# Patient Record
Sex: FEMALE | Race: WHITE | NOT HISPANIC OR LATINO | URBAN - METROPOLITAN AREA
[De-identification: names, ages, dates, MRNs, and addresses within clinical notes are randomized per-mention and may not be internally consistent; named-entity substitution may affect disease eponyms.]

---

## 2019-09-22 ENCOUNTER — EMERGENCY (EMERGENCY)
Facility: HOSPITAL | Age: 29
LOS: 1 days | Discharge: HOME | End: 2019-09-22
Attending: EMERGENCY MEDICINE
Payer: MEDICAID

## 2019-09-22 VITALS
WEIGHT: 119.93 LBS | HEART RATE: 61 BPM | OXYGEN SATURATION: 100 % | HEIGHT: 63 IN | RESPIRATION RATE: 16 BRPM | DIASTOLIC BLOOD PRESSURE: 72 MMHG | TEMPERATURE: 98 F | SYSTOLIC BLOOD PRESSURE: 107 MMHG

## 2019-09-22 PROCEDURE — 73130 X-RAY EXAM OF HAND: CPT | Mod: 26,RT

## 2019-09-22 PROCEDURE — 99283 EMERGENCY DEPT VISIT LOW MDM: CPT

## 2019-09-22 PROCEDURE — 73130 X-RAY EXAM OF HAND: CPT

## 2019-09-22 PROCEDURE — 90715 TDAP VACCINE 7 YRS/> IM: CPT

## 2019-09-22 PROCEDURE — 12001 RPR S/N/AX/GEN/TRNK 2.5CM/<: CPT

## 2019-09-22 PROCEDURE — 90471 IMMUNIZATION ADMIN: CPT

## 2019-09-22 PROCEDURE — 99283 EMERGENCY DEPT VISIT LOW MDM: CPT | Mod: 25

## 2019-09-22 RX ORDER — IBUPROFEN 200 MG
1 TABLET ORAL
Qty: 30 | Refills: 0
Start: 2019-09-22

## 2019-09-22 RX ORDER — TETANUS TOXOID, REDUCED DIPHTHERIA TOXOID AND ACELLULAR PERTUSSIS VACCINE, ADSORBED 5; 2.5; 8; 8; 2.5 [IU]/.5ML; [IU]/.5ML; UG/.5ML; UG/.5ML; UG/.5ML
0.5 SUSPENSION INTRAMUSCULAR ONCE
Refills: 0 | Status: COMPLETED | OUTPATIENT
Start: 2019-09-22 | End: 2019-09-22

## 2019-09-22 RX ORDER — IBUPROFEN 200 MG
600 TABLET ORAL ONCE
Refills: 0 | Status: COMPLETED | OUTPATIENT
Start: 2019-09-22 | End: 2019-09-22

## 2019-09-22 RX ADMIN — TETANUS TOXOID, REDUCED DIPHTHERIA TOXOID AND ACELLULAR PERTUSSIS VACCINE, ADSORBED 0.5 MILLILITER(S): 5; 2.5; 8; 8; 2.5 SUSPENSION INTRAMUSCULAR at 21:56

## 2019-09-22 RX ADMIN — Medication 600 MILLIGRAM(S): at 20:43

## 2019-09-22 NOTE — ED PROVIDER NOTE - PHYSICAL EXAMINATION
laceration to right fourth digit volar surface  apprx 3 cm with avulsed skin looks partially nonviable  tendons appear intact  small scratch to palmar surgace does not need sutures

## 2019-09-22 NOTE — ED PROVIDER NOTE - OBJECTIVE STATEMENT
27 y/o F with no significant PMHx presents to the ED with complaints of laceration. Patient states she cut her right hand with glass and presents for evaluation. Notes associated pain and numbness locally to the site. Last Tetanus is unknown. Denies any other acute complaints.

## 2019-09-22 NOTE — ED PROVIDER NOTE - PATIENT PORTAL LINK FT
You can access the FollowMyHealth Patient Portal offered by [facility] by registering at the following website: [email or web address]. By joining [employer]’s FollowMyHealth portal, you will also be able to view your health information using other applications (apps) compatible with our system.

## 2024-02-24 ENCOUNTER — EMERGENCY (EMERGENCY)
Facility: HOSPITAL | Age: 34
LOS: 1 days | Discharge: HOME | End: 2024-02-24
Attending: STUDENT IN AN ORGANIZED HEALTH CARE EDUCATION/TRAINING PROGRAM
Payer: MEDICAID

## 2024-02-24 VITALS
OXYGEN SATURATION: 98 % | TEMPERATURE: 98 F | HEART RATE: 67 BPM | RESPIRATION RATE: 16 BRPM | SYSTOLIC BLOOD PRESSURE: 114 MMHG | HEIGHT: 62 IN | WEIGHT: 111.99 LBS | DIASTOLIC BLOOD PRESSURE: 78 MMHG

## 2024-02-24 PROBLEM — Z78.9 OTHER SPECIFIED HEALTH STATUS: Chronic | Status: ACTIVE | Noted: 2019-09-22

## 2024-02-24 PROCEDURE — 73140 X-RAY EXAM OF FINGER(S): CPT

## 2024-02-24 PROCEDURE — 73140 X-RAY EXAM OF FINGER(S): CPT | Mod: 26,RT

## 2024-02-24 PROCEDURE — 99283 EMERGENCY DEPT VISIT LOW MDM: CPT | Mod: 25

## 2024-02-24 PROCEDURE — 99284 EMERGENCY DEPT VISIT MOD MDM: CPT | Mod: 25

## 2024-02-24 PROCEDURE — 11740 EVACUATION SUBUNGUAL HMTMA: CPT | Mod: F7

## 2024-02-24 PROCEDURE — 11740 EVACUATION SUBUNGUAL HMTMA: CPT

## 2024-02-24 RX ORDER — IBUPROFEN 200 MG
600 TABLET ORAL ONCE
Refills: 0 | Status: COMPLETED | OUTPATIENT
Start: 2024-02-24 | End: 2024-02-24

## 2024-02-24 RX ADMIN — Medication 600 MILLIGRAM(S): at 14:57

## 2024-02-24 RX ADMIN — Medication 600 MILLIGRAM(S): at 15:46

## 2024-02-24 NOTE — ED PROVIDER NOTE - OBJECTIVE STATEMENT
33-year-old female, no significant past medical history, presents for evaluation status post right middle finger injury 2 days ago.  Accidentally closed the car door on the finger.  Since then reports increasing swelling and throbbing pain sensation to the fingertip.  Denies any open wounds during initial injury.  Denies any other complaints.

## 2024-02-24 NOTE — ED PROVIDER NOTE - PATIENT PORTAL LINK FT
You can access the FollowMyHealth Patient Portal offered by [facility] by registering at the following website: [email or web address]. By joining [employer]’s FollowMyHealth portal, you will also be able to view your health information using other applications (apps) compatible with our system.

## 2024-02-24 NOTE — ED ADULT NURSE NOTE - NSFALLUNIVINTERV_ED_ALL_ED
Bed/Stretcher in lowest position, wheels locked, appropriate side rails in place/Call bell, personal items and telephone in reach/Instruct patient to call for assistance before getting out of bed/chair/stretcher/Non-slip footwear applied when patient is off stretcher/[place] to call system/Physically safe environment - no spills, clutter or unnecessary equipment/Purposeful proactive rounding/Room/bathroom lighting operational, light cord in reach

## 2024-02-24 NOTE — ED PROVIDER NOTE - NSFOLLOWUPINSTRUCTIONS_ED_ALL_ED_FT
Follow up with the primary care doctor within 1 week.  For pain you can take over the counter Ibuprofen 600 mg orally every 6 hours as needed for pain. Take medication with food.     If you experience any new or worsening symptoms or if you are concerned you can always come back to the emergency for a re-evaluation.  Some results may not be available at the time of your discharge from the hospital. You can download the FOLLOW MY HEALTH abigail and have access to these results.

## 2024-02-24 NOTE — ED PROVIDER NOTE - CLINICAL SUMMARY MEDICAL DECISION MAKING FREE TEXT BOX
33-year-old female status post right middle finger crush injury 2 days ago.  X-ray negative for fracture.  Bedside trephination with a small amount of blood drained.  Patient feels better.  Discussed red flags to come back to the hospital.  Otherwise PMD follow-up within 1 week.

## 2024-02-24 NOTE — ED ADULT TRIAGE NOTE - NS ED TRIAGE AVPU SCALE
Alert-The patient is alert, awake and responds to voice. The patient is oriented to time, place, and person. The triage nurse is able to obtain subjective information. Authored by Resident/PA/NP

## 2024-02-24 NOTE — ED PROVIDER NOTE - PHYSICAL EXAMINATION
Right middle finger full range of motion.  R middle finger 100% subungual hematoma with slight elevation of the eponychium.  No skin break.  No erythema of the finger or streaking.  Localized tenderness over the nail.

## 2024-11-25 ENCOUNTER — EMERGENCY (EMERGENCY)
Facility: HOSPITAL | Age: 34
LOS: 1 days | Discharge: HOME | End: 2024-11-25
Attending: STUDENT IN AN ORGANIZED HEALTH CARE EDUCATION/TRAINING PROGRAM
Payer: MEDICAID

## 2024-11-25 VITALS
RESPIRATION RATE: 18 BRPM | SYSTOLIC BLOOD PRESSURE: 104 MMHG | TEMPERATURE: 98 F | HEIGHT: 64 IN | OXYGEN SATURATION: 98 % | DIASTOLIC BLOOD PRESSURE: 71 MMHG | WEIGHT: 117.95 LBS | HEART RATE: 79 BPM

## 2024-11-25 PROCEDURE — 99283 EMERGENCY DEPT VISIT LOW MDM: CPT | Mod: 25

## 2024-11-25 PROCEDURE — 73140 X-RAY EXAM OF FINGER(S): CPT | Mod: 26,LT

## 2024-11-25 PROCEDURE — 73140 X-RAY EXAM OF FINGER(S): CPT

## 2024-11-25 PROCEDURE — 99284 EMERGENCY DEPT VISIT MOD MDM: CPT

## 2024-11-25 RX ORDER — IBUPROFEN 200 MG
600 TABLET ORAL ONCE
Refills: 0 | Status: COMPLETED | OUTPATIENT
Start: 2024-11-25 | End: 2024-11-25

## 2024-11-25 RX ADMIN — Medication 600 MILLIGRAM(S): at 06:53

## 2024-11-25 NOTE — ED PROVIDER NOTE - NSFOLLOWUPINSTRUCTIONS_ED_ALL_ED_FT
Rest, ice and elevate the finger.    Take ibuprofen 600mg every 6 hrs for pain with food.     Please return to the emergency department for any new or concerning symptoms such as weakness, numbness, swelling, inability to move or feel limbs, skin discoloration, rash, fever, chest pain, difficulty breathing. Rest, ice and elevate the finger.    Take ibuprofen 600mg every 6 hrs for pain with food.     Please return to the emergency department for any new or concerning symptoms such as weakness, numbness, swelling, inability to move or feel limbs, skin discoloration, rash, fever, chest pain, difficulty breathing.    please follow up with your primary care doctor as needed.

## 2024-11-25 NOTE — ED PROVIDER NOTE - ATTENDING CONTRIBUTION TO CARE
34-year-old female, no past medical history presenting with 2 days of left-sided hand pain to 5th digit.   Exam significant for PIP tenderness to palpation, and minor swelling.  Neurovascular intact, cap refill less than 2 seconds, full range of motion, no obvious deformities noted.  XR imaging showing no fracture/dislocation. Will place splint and discharge.

## 2024-11-25 NOTE — ED PROVIDER NOTE - PATIENT PORTAL LINK FT
You can access the FollowMyHealth Patient Portal offered by [facility] by registering at the following website: [email or web address]. By joining [employer]’s FollowMyHealth portal, you will also be able to view your health information using other applications (apps) compatible with our system.

## 2024-11-25 NOTE — ED PROVIDER NOTE - NSFOLLOWUPCLINICS_GEN_ALL_ED_FT
[facility]  Orthopedics  [address] [place] [address]  Phone: [phone]  Fax: [phone]

## 2024-11-25 NOTE — ED PROVIDER NOTE - CLINICAL SUMMARY MEDICAL DECISION MAKING FREE TEXT BOX
35y/o female with no known PMH presents with L#5 finger pain x 2 days. denies trauma. admits to feeling some sharp pain to finger 2 days ago. states she has swelling, denies systemic signs or symptoms. patient states she is not pregnant. PE: peripheral pulses intact, cap refill <3sec, ROM L#5 finger intact, tenderness to L#5 DIPJ with mild edema, no erythema, no evidence of trauma, ext cool. concern for sprain vs frx. no concern for cellulitis vs gout. will get XR and splint finger.

## 2024-11-25 NOTE — ED ADULT NURSE NOTE - NSFALLUNIVINTERV_ED_ALL_ED
Bed/Stretcher in lowest position, wheels locked, appropriate side rails in place/Call bell, personal items and telephone in reach/Instruct patient to call for assistance before getting out of bed/chair/stretcher/Non-slip footwear applied when patient is off stretcher/[place] to call system/Physically safe environment - no spills, clutter or unnecessary equipment/Purposeful proactive rounding/Room/bathroom lighting operational, light cord in reach

## 2024-12-03 ENCOUNTER — APPOINTMENT (OUTPATIENT)
Age: 34
End: 2024-12-03
Payer: MEDICAID

## 2024-12-03 ENCOUNTER — NON-APPOINTMENT (OUTPATIENT)
Age: 34
End: 2024-12-03

## 2024-12-03 VITALS
HEART RATE: 69 BPM | HEIGHT: 64 IN | BODY MASS INDEX: 19.63 KG/M2 | SYSTOLIC BLOOD PRESSURE: 110 MMHG | WEIGHT: 115 LBS | OXYGEN SATURATION: 100 % | DIASTOLIC BLOOD PRESSURE: 70 MMHG

## 2024-12-03 DIAGNOSIS — F17.200 NICOTINE DEPENDENCE, UNSPECIFIED, UNCOMPLICATED: ICD-10-CM

## 2024-12-03 DIAGNOSIS — S63.639A SPRAIN OF INTERPHALANGEAL JOINT OF UNSPECIFIED FINGER, INITIAL ENCOUNTER: ICD-10-CM

## 2024-12-03 DIAGNOSIS — Z78.9 OTHER SPECIFIED HEALTH STATUS: ICD-10-CM

## 2024-12-03 DIAGNOSIS — Z86.39 PERSONAL HISTORY OF OTHER ENDOCRINE, NUTRITIONAL AND METABOLIC DISEASE: ICD-10-CM

## 2024-12-03 PROBLEM — Z00.00 ENCOUNTER FOR PREVENTIVE HEALTH EXAMINATION: Status: ACTIVE | Noted: 2024-12-03

## 2024-12-03 PROCEDURE — 99203 OFFICE O/P NEW LOW 30 MIN: CPT

## 2025-05-28 NOTE — ED ADULT NURSE NOTE - NSSEPSISSUSPECTED_ED_A_ED
Infectious Disease Consult Note    Assessment / Plan:       55 y/o male with suspect surgical site infection after recent decompression L4-S1 and fusion.    Continue Vanco/Cefepime while inpatient.    MRI preferred to evaluate for deeper collection.  Able to perform with existing wound vac in place?      All other sources of infection negative and this is concerning for persistent surgical site infection for which will likely be difficult to control without source control.    If no further management per spine surgery, then trial of doxycycline and Levaquin for 2 weeks can be attempted although high risk of treatment failure.         Reason for Consult: Suspect surgical site infection  Date of Consultation: May 28, 2025  Date of Admission: 5/24/2025  Referring Physician: Dr. Morrison    HPI:    55 y/o male with DM, HTN, lumbar spondylosis and stenosis s/p L4-S1 with decompression and fusion on 5/20/25 seen post-operatively as developed fever to 100.6 on 5/24/25 and leukocytosis which patient has had for years predating current admission.  Feels well except for pain in lower back post-op.  Tolerating PO diet no respiratory symptoms no abdominal issues no diarrhea and duplex US b/l LE without clot.  Developed drainage from lumbar spinal surgical site that has persisted and wound vac placed.  Denies headache nor dizziness.    Used to work as a .    Past Medical History:  Past Medical History:   Diagnosis Date    Diabetes mellitus (HCC)     Hematuria Oct 2015    Hypertension     Osteoarthritis of back          Surgical History:  Past Surgical History:   Procedure Laterality Date    COLONOSCOPY Left 12/2/2020    .COLONOSCOPY    :- performed by Neno Weaver MD at [facility] ENDOSCOPY    LUMBAR SPINE SURGERY N/A 5/20/2025    L4 - S1 LUMBAR LAMINECTOMY; L4 - S1 POSTERIOR LUMBAR FUSION (MAZOR/O-ARM) ( (ERAS) performed by Todd Villela MD at [facility] MAIN OR         Family History:   Family History   Problem Relation Age of 
[facility]  PROGRAM FOR DIABETES HEALTH  DIABETES MANAGEMENT CONSULT    Consulted by Provider for advanced nursing evaluation and care for inpatient blood glucose management.    Evaluation and Action Plan   Lazarus Giles is a 56 y.o. male with HTN, DMT2, obesity, lumbar spinal stenosis with neurogenic claudication s/p L4-S1 lumbar laminectomy and posterior lumbar fusion on 5/20 who presented for complaints of increased paina nd drainage from surgical site with associated s/s of malaise, fever, and chills. Temperature upon arrival to ED fever of 100.6, lab work showed WBC 15.2, platelet 431. He was admitted under hospitalist service for SIRS with concern for post op infection with treatment of vancomycin and cefepime. The Program for Diabetes Health has been consulted to assist in glycemic management and advanced diabetes management assessment this admission.    Diabetes self-management practices: Mr. Giles is a type 2 diabetic recent diagnosis in 2024 with current A1c of 8.9% indicating poor glycemic control but with improvement from historical A1c of 11.7% on 1/24/2025. Per his dispense report he is currently taking Lantus 10 units nightly and Janumet  mg BID. He endorses he has made significant changes to his diet such as cutting back on coffee creamer, cut back on snacking which was typically about 4-5 servings in one sitting versus now he is having maybe 4-5 snacks per week. He endorses he is following a carb controlled diet. He monitors his BG with FSBG with avg -165 however this is not reflected in his current A1c. Suspect if he continues to make diet changes and incorporates more low carb snacks his BG pattern will improve and his A1c will reduce.     Admission  with rise to 191. Primary care team started Lantus 10 units nightly with medium dose correctional scale. , will continue current basal dosing and start Humalog 4 units TID with meals given consistently elevated preprandial BG 
Ortho Consult    Note placed for order compliance only. Please see Ortho Spine note by Dr Todd Villela from 05/27 for plan of care  
Orthopedic Surgery Consult Note:  CC: Back pain, drainage    Subjective:  HPI: Lazarus Giles is a 56 y.o. male who male who presented back to the hospital yesterday due to severe back pain and continued drainage.  He does have a history of L4-S1 lumbar laminectomy and L4-S1 posterior lumbar fusion on 5/20.  He was discharged from the hospital on 522.  Over the past several days he has had worsening pain and continued drainage.  He reports no leg pain at this time.  He states that his shirt has become saturated multiple times despite dressing changes.  He denies headache but his wife does endorse that he has been lethargic and not himself.  No chills.  No bowel incontinence.  Denies chest pain, shortness breath, fever, nausea, vomiting, chills, sensation changes.    ROS: With the exception of pertinent positives and negatives, all other systems reviewed are negative.  PMH:  has a past medical history of Diabetes mellitus (HCC), Hematuria (Oct 2015), Hypertension, and Osteoarthritis of back.  PSH:  has a past surgical history that includes Colonoscopy (Left, 12/2/2020) and Lumbar spine surgery (N/A, 5/20/2025).   Medications: FreeStyle Marely 3 Sensor, Pen Needles, amLODIPine, diphenhydrAMINE-APAP (sleep), glucose monitoring, insulin glargine, naloxone, and oxyCODONE   Allergies: Patient has no known allergies.   FH: family history includes Cancer in his brother; Diabetes in his mother; Hypertension in his mother; No Known Problems in his brother, brother, brother, son, and son; Pneumonia in his father; Stroke in his brother.  SH:  reports that he has been smoking cigarettes. He started smoking about 40 years ago. He has a 20.2 pack-year smoking history. He has never used smokeless tobacco. He reports current alcohol use. He reports that he does not use drugs.     Objective:  General: \"A&O x 3.    Physical Exam:  LE:  Incision approximated there is drainage from the area with surrounding saturation on his shirt.  
No